# Patient Record
Sex: FEMALE | Race: BLACK OR AFRICAN AMERICAN | HISPANIC OR LATINO | Employment: UNEMPLOYED | URBAN - METROPOLITAN AREA
[De-identification: names, ages, dates, MRNs, and addresses within clinical notes are randomized per-mention and may not be internally consistent; named-entity substitution may affect disease eponyms.]

---
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
## 2025-02-05 ENCOUNTER — APPOINTMENT (EMERGENCY)
Dept: RADIOLOGY | Facility: HOSPITAL | Age: 20
End: 2025-02-05
Payer: COMMERCIAL

## 2025-02-05 ENCOUNTER — HOSPITAL ENCOUNTER (EMERGENCY)
Facility: HOSPITAL | Age: 20
Discharge: HOME | End: 2025-02-05
Attending: EMERGENCY MEDICINE
Payer: COMMERCIAL

## 2025-02-05 VITALS
HEART RATE: 64 BPM | OXYGEN SATURATION: 99 % | RESPIRATION RATE: 18 BRPM | SYSTOLIC BLOOD PRESSURE: 147 MMHG | TEMPERATURE: 97.1 F | DIASTOLIC BLOOD PRESSURE: 86 MMHG

## 2025-02-05 DIAGNOSIS — R07.9 CHEST PAIN: Primary | ICD-10-CM

## 2025-02-05 LAB
ATRIAL RATE: 81 BPM
EXT PREGNANCY TEST URINE: NEGATIVE
EXT. CONTROL: NORMAL
P AXIS: 75 DEGREES
PR INTERVAL: 142 MS
QRS AXIS: 84 DEGREES
QRSD INTERVAL: 80 MS
QT INTERVAL: 398 MS
QTC INTERVAL: 462 MS
T WAVE AXIS: 59 DEGREES
VENTRICULAR RATE: 81 BPM

## 2025-02-05 PROCEDURE — 70360 X-RAY EXAM OF NECK: CPT

## 2025-02-05 PROCEDURE — 99285 EMERGENCY DEPT VISIT HI MDM: CPT | Performed by: EMERGENCY MEDICINE

## 2025-02-05 PROCEDURE — 93005 ELECTROCARDIOGRAM TRACING: CPT

## 2025-02-05 PROCEDURE — 93010 ELECTROCARDIOGRAM REPORT: CPT | Performed by: INTERNAL MEDICINE

## 2025-02-05 PROCEDURE — 71046 X-RAY EXAM CHEST 2 VIEWS: CPT

## 2025-02-05 PROCEDURE — 81025 URINE PREGNANCY TEST: CPT

## 2025-02-05 PROCEDURE — 99284 EMERGENCY DEPT VISIT MOD MDM: CPT

## 2025-02-05 RX ORDER — IBUPROFEN 400 MG/1
400 TABLET, FILM COATED ORAL ONCE
Status: COMPLETED | OUTPATIENT
Start: 2025-02-05 | End: 2025-02-05

## 2025-02-05 RX ADMIN — IBUPROFEN 400 MG: 400 TABLET, FILM COATED ORAL at 19:50

## 2025-02-06 NOTE — DISCHARGE INSTRUCTIONS
Hello you were seen today for chest pain    Please return to the emergency room if you develop any worsening chest pain, shortness of breath, nausea, lightheadedness, passing out, fever, chills, any new symptoms

## 2025-02-06 NOTE — ED ATTENDING ATTESTATION
2/5/2025  I, Boo Briggs MD, saw and evaluated the patient. I have discussed the patient with the resident/non-physician practitioner and agree with the resident's/non-physician practitioner's findings, Plan of Care, and MDM as documented in the resident's/non-physician practitioner's note, except where noted. All available labs and Radiology studies were reviewed.  I was present for key portions of any procedure(s) performed by the resident/non-physician practitioner and I was immediately available to provide assistance.       At this point I agree with the current assessment done in the Emergency Department.  I have conducted an independent evaluation of this patient a history and physical is as follows:    Present for the last 3 weeks, reproducible Right sided chest pain, worse with movement and breathing. No known cardiac issues. Pt denies CP/SOB/F/C/N/V/D/C, no dysuria, burning on urination or blood in urine.     Gen: Pt is in NAD  HEENT: Head is atraumatic, EOM's intact, neck has FROM  Chest: CTAB, Palpation over Right chest wall reproduces discomfort.   Heart: RRR  Abdomen: Soft, NT/ND  Musculoskeletal: FROM in all extremities  Skin: No rash, no ecchymosis  Neuro: Awake, alert, oriented x4; Cranial nerves II-XII intact  Psych: Normal affect    MDM -  Will check ECG and CXR, urine preg. Feel likely this is MSK in young patient with no known cardiac issues. Will likely recommend NSAIDs for discomfort.     ED Course         Critical Care Time  Procedures

## 2025-02-06 NOTE — ED NOTES
Pt ambulated to bathroom with even and steady gait. Urine cup provided     Amy Lee RN  02/05/25 7030

## 2025-02-06 NOTE — ED PROVIDER NOTES
Time reflects when diagnosis was documented in both MDM as applicable and the Disposition within this note       Time User Action Codes Description Comment    2/5/2025  8:40 PM Raymundo May Add [R07.9] Chest pain           ED Disposition       ED Disposition   Discharge    Condition   Stable    Date/Time   Wed Feb 5, 2025  8:40 PM    Comment   Roseanne JOHNSON Gross discharge to home/self care.                   Assessment & Plan       Medical Decision Making  Patient well-appearing on exam AOx4, GCS 15.  History and physical exam not concerning for PE, ACS, pericarditis.  PERC negative.  Wells 0.  Pain has been stable over the past 3 weeks.  She does not endorse any dyspnea or radiation of the pain.  She denies any other symptoms including nausea, lightheadedness, syncope.    Differential diagnosis includes but is not limited to: Pleurisy, precordial catch, costochondritis    Doubt ACS given the patient's age, lack of medical history, the pain being present for 3 weeks and not changing.    Based on patient's clinical history and physical exam there are no red flag signs or symptoms     Patient denies any additional symptoms on direct questioning except those explicitly noted in the HPI and ROS.     Triage note was reviewed and patient asked directly about concerns mentioned in triage note.     I will order appropriate testing to narrow my differential    Unless otherwise noted:  - There is no language barrier  - Chart was reviewed   - Labs and imaging were reviewed    Amount and/or Complexity of Data Reviewed  Labs: ordered.  Radiology: ordered and independent interpretation performed.    Risk  Prescription drug management.        ED Course as of 02/05/25 2043 Wed Feb 05, 2025 2041 Chest x-ray and lateral neck x-ray without any acute findings.   2042 EKG interpretation by me.  Normal sinus rhythm rate 81 normal axis.  No ST segment elevation or depressions, DE, QRS, QT normal.       Medications   ibuprofen (MOTRIN)  tablet 400 mg (400 mg Oral Given 2/5/25 1950)       ED Risk Strat Scores                      PERC Rule for PE      Flowsheet Row Most Recent Value   PERC Rule for PE    Age >=50 0 Filed at: 02/05/2025 2041   HR >=100 0 Filed at: 02/05/2025 2041   O2 Sat on room air < 95% 0 Filed at: 02/05/2025 2041   History of PE or DVT 0 Filed at: 02/05/2025 2041   Recent trauma or surgery 0 Filed at: 02/05/2025 2041   Hemoptysis 0 Filed at: 02/05/2025 2041   Exogenous estrogen 0 Filed at: 02/05/2025 2041   Unilateral leg swelling 0 Filed at: 02/05/2025 2041   PERC Rule for PE Results 0 Filed at: 02/05/2025 2041                Wells' Criteria for PE      Flowsheet Row Most Recent Value   Wells' Criteria for PE    Clinical signs and symptoms of DVT 0 Filed at: 02/05/2025 2042   PE is primary diagnosis or equally likely 0 Filed at: 02/05/2025 2042   HR >100 0 Filed at: 02/05/2025 2042   Immobilization at least 3 days or Surgery in the previous 4 weeks 0 Filed at: 02/05/2025 2042   Previous, objectively diagnosed PE or DVT 0 Filed at: 02/05/2025 2042   Hemoptysis 0 Filed at: 02/05/2025 2042   Malignancy with treatment within 6 months or palliative 0 Filed at: 02/05/2025 2042   Wells' Criteria Total 0 Filed at: 02/05/2025 2042                        History of Present Illness       Chief Complaint   Patient presents with    Pain With Breathing     Pain with inspiration and right sided chest pain.        History reviewed. No pertinent past medical history.   History reviewed. No pertinent surgical history.   History reviewed. No pertinent family history.   Social History     Tobacco Use    Smoking status: Never    Smokeless tobacco: Never   Vaping Use    Vaping status: Every Day    Substances: Nicotine   Substance Use Topics    Alcohol use: Not Currently     Comment: at parties, no binge drinking, not even monthly    Drug use: Yes     Frequency: 7.0 times per week     Types: Marijuana     Comment: smokes marijuana daily       E-Cigarette/Vaping    E-Cigarette Use Current Every Day User     Comments nicotine vaping daily       E-Cigarette/Vaping Substances    Nicotine Yes       I have reviewed and agree with the history as documented.     Patient is a 19-year-old female with no past medical history presents to the ED with 3 weeks of right sided chest pain that began out of the blue.  No chest trauma.  She says the pain is worse when she takes a deep breath.  She denies any shortness of breath.  No fevers or chills.  No history of blood clots.  She says she does not take any medications.  The pain does not travel, it stays in her chest.  It has not been getting worse over the past 3 weeks but has not been getting better.        Review of Systems   Constitutional:  Negative for chills and fever.   Respiratory:  Negative for apnea, cough, choking, chest tightness, shortness of breath, wheezing and stridor.    Cardiovascular:  Positive for chest pain. Negative for palpitations and leg swelling.   Gastrointestinal:  Negative for abdominal pain.   Genitourinary:  Negative for dysuria.   Neurological:  Negative for seizures and syncope.   All other systems reviewed and are negative.          Objective       ED Triage Vitals   Temperature Pulse Blood Pressure Respirations SpO2 Patient Position - Orthostatic VS   02/05/25 1828 02/05/25 1836 02/05/25 1828 02/05/25 1828 02/05/25 1828 --   (!) 97.1 °F (36.2 °C) 64 147/86 18 99 %       Temp src Heart Rate Source BP Location FiO2 (%) Pain Score    -- 02/05/25 1836 -- -- 02/05/25 1950     Monitor   6      Vitals      Date and Time Temp Pulse SpO2 Resp BP Pain Score FACES Pain Rating User   02/05/25 1950 -- -- -- -- -- 6 -- EG   02/05/25 1836 -- 64 -- -- -- -- -- AB   02/05/25 1828 97.1 °F (36.2 °C) -- 99 % 18 147/86 -- -- JORGE            Physical Exam  Vitals and nursing note reviewed.   Constitutional:       General: She is not in acute distress.     Appearance: Normal appearance. She is not  ill-appearing, toxic-appearing or diaphoretic.   HENT:      Head: Normocephalic and atraumatic.      Nose: Nose normal.      Mouth/Throat:      Mouth: Mucous membranes are moist.   Eyes:      General: No scleral icterus.        Right eye: No discharge.         Left eye: No discharge.      Extraocular Movements: Extraocular movements intact.      Conjunctiva/sclera: Conjunctivae normal.      Pupils: Pupils are equal, round, and reactive to light.   Cardiovascular:      Rate and Rhythm: Normal rate and regular rhythm.      Pulses: Normal pulses.      Heart sounds: Normal heart sounds. No murmur heard.     No friction rub. No gallop.   Pulmonary:      Effort: Pulmonary effort is normal. No tachypnea, bradypnea, accessory muscle usage or respiratory distress.      Breath sounds: Normal breath sounds. No stridor. No wheezing, rhonchi or rales.   Chest:      Chest wall: No tenderness.   Abdominal:      General: Abdomen is flat. There is no distension.      Palpations: Abdomen is soft. There is no mass.      Tenderness: There is no abdominal tenderness. There is no right CVA tenderness, left CVA tenderness, guarding or rebound.      Hernia: No hernia is present.   Musculoskeletal:      Cervical back: Normal range of motion and neck supple. No rigidity.      Right lower leg: No edema.      Left lower leg: No edema.      Comments: Tenderness along the right parasternal border   Skin:     General: Skin is warm and dry.      Capillary Refill: Capillary refill takes less than 2 seconds.      Coloration: Skin is not jaundiced.      Findings: No bruising.   Neurological:      Mental Status: She is alert and oriented to person, place, and time.      GCS: GCS eye subscore is 4. GCS verbal subscore is 5. GCS motor subscore is 6.      Cranial Nerves: No dysarthria or facial asymmetry.   Psychiatric:         Mood and Affect: Mood normal.         Behavior: Behavior normal. Behavior is cooperative.         Thought Content: Thought  content normal.         Judgment: Judgment normal.         Results Reviewed       Procedure Component Value Units Date/Time    POCT pregnancy, urine [[id number]]  (Normal) Collected: 02/05/25 1912    Lab Status: Final result Updated: 02/05/25 1916     EXT Preg Test, Ur Negative     Control Valid            XR neck soft tissue   ED Interpretation by Raymundo May MD (02/05 2039)   No acute findings on lateral neck x-ray      XR chest 2 views   ED Interpretation by Raymundo May MD (02/05 1935)   No acute cardiopulmonary abnormality.  Opacity in the neck region likely patient's hair, given physical exam      Final Interpretation by Darrin Kang MD (02/05 1944)      No acute cardiopulmonary disease.            Workstation performed: [id number]             Procedures    ED Medication and Procedure Management   Prior to Admission Medications   Prescriptions Last Dose Informant Patient Reported? Taking?   ondansetron (ZOFRAN-ODT) 4 mg disintegrating tablet   No No   Sig: Take 1 tablet (4 mg total) by mouth every 6 (six) hours as needed for nausea or vomiting      Facility-Administered Medications: None     Patient's Medications   Discharge Prescriptions    No medications on file     No discharge procedures on file.  ED SEPSIS DOCUMENTATION   Time reflects when diagnosis was documented in both MDM as applicable and the Disposition within this note       Time User Action Codes Description Comment    2/5/2025  8:40 PM Raymundo May Add [R07.9] Chest pain                  Raymundo May MD  02/05/25 2043